# Patient Record
Sex: FEMALE | URBAN - METROPOLITAN AREA
[De-identification: names, ages, dates, MRNs, and addresses within clinical notes are randomized per-mention and may not be internally consistent; named-entity substitution may affect disease eponyms.]

---

## 2023-08-28 ENCOUNTER — TELEPHONE (OUTPATIENT)
Dept: OBGYN CLINIC | Facility: MEDICAL CENTER | Age: 23
End: 2023-08-28

## 2023-08-28 DIAGNOSIS — Z32.01 POSITIVE PREGNANCY TEST: Primary | ICD-10-CM

## 2023-08-28 NOTE — TELEPHONE ENCOUNTER
Patient called with positive at home test. LMP 07/01/2023 . Patient was informed labs will be place on her chart to do it once we have those results we will call her to set up a dating US. Advised patient to call back if any questions or concerns.  Aware we only deliver at [facility]

## 2023-09-05 ENCOUNTER — APPOINTMENT (OUTPATIENT)
Dept: LAB | Facility: MEDICAL CENTER | Age: 23
End: 2023-09-05

## 2023-09-05 DIAGNOSIS — Z32.01 POSITIVE PREGNANCY TEST: ICD-10-CM

## 2023-09-05 LAB
ABO GROUP BLD: NORMAL
B-HCG SERPL-ACNC: ABNORMAL MIU/ML (ref 0–5)
BLD GP AB SCN SERPL QL: NEGATIVE
PROGEST SERPL-MCNC: 21.13 NG/ML
RH BLD: POSITIVE
SPECIMEN EXPIRATION DATE: NORMAL

## 2023-09-05 PROCEDURE — 86901 BLOOD TYPING SEROLOGIC RH(D): CPT

## 2023-09-05 PROCEDURE — 86900 BLOOD TYPING SEROLOGIC ABO: CPT

## 2023-09-05 PROCEDURE — 86850 RBC ANTIBODY SCREEN: CPT

## 2023-09-05 PROCEDURE — 84702 CHORIONIC GONADOTROPIN TEST: CPT

## 2023-09-05 PROCEDURE — 36415 COLL VENOUS BLD VENIPUNCTURE: CPT

## 2023-09-05 PROCEDURE — 84144 ASSAY OF PROGESTERONE: CPT

## 2023-09-11 ENCOUNTER — TELEPHONE (OUTPATIENT)
Dept: OBGYN CLINIC | Facility: MEDICAL CENTER | Age: 23
End: 2023-09-11

## 2023-09-11 DIAGNOSIS — Z32.01 POSITIVE PREGNANCY TEST: Primary | ICD-10-CM

## 2023-09-11 NOTE — TELEPHONE ENCOUNTER
PT called in inquiring about her labs. PT is nervous because she has been high risk in the past. Please review, thanks.

## 2023-09-11 NOTE — TELEPHONE ENCOUNTER
Patient aware of blood work results. US order placed to get done through radiology due to schedule availability. Patient made aware once we get results and the report is reviewed by provider, we will call with next steps.

## 2023-09-12 ENCOUNTER — HOSPITAL ENCOUNTER (OUTPATIENT)
Dept: ULTRASOUND IMAGING | Facility: HOSPITAL | Age: 23
Discharge: HOME | End: 2023-09-12
Payer: COMMERCIAL

## 2023-09-12 ENCOUNTER — TELEPHONE (OUTPATIENT)
Dept: OBGYN CLINIC | Facility: CLINIC | Age: 23
End: 2023-09-12

## 2023-09-12 DIAGNOSIS — Z32.01 POSITIVE PREGNANCY TEST: ICD-10-CM

## 2023-09-12 PROCEDURE — 76801 OB US < 14 WKS SINGLE FETUS: CPT

## 2023-09-12 NOTE — TELEPHONE ENCOUNTER
Left message for patient to call office. Patient's ultrasound resulted with viable pregnancy.   Needs to schedule ob intake

## 2023-09-14 ENCOUNTER — TELEPHONE (OUTPATIENT)
Dept: OBGYN CLINIC | Facility: CLINIC | Age: 23
End: 2023-09-14

## 2023-09-14 NOTE — TELEPHONE ENCOUNTER
----- Message from [address] sent at 9/12/2023 12:47 PM EDT -----  Please call patient first trimester ultrasound is normal she is approximately 9 weeks and 4 days today. Recommend scheduling for an intake and OB H&P.   Thank you

## 2023-09-21 ENCOUNTER — INITIAL PRENATAL (OUTPATIENT)
Dept: OBGYN CLINIC | Facility: CLINIC | Age: 23
End: 2023-09-21
Payer: COMMERCIAL

## 2023-09-21 VITALS
HEIGHT: 64 IN | BODY MASS INDEX: 24.82 KG/M2 | DIASTOLIC BLOOD PRESSURE: 58 MMHG | SYSTOLIC BLOOD PRESSURE: 106 MMHG | WEIGHT: 145.4 LBS

## 2023-09-21 DIAGNOSIS — Z34.81 PRENATAL CARE, SUBSEQUENT PREGNANCY, FIRST TRIMESTER: Primary | ICD-10-CM

## 2023-09-21 PROCEDURE — T1001 NURSING ASSESSMENT/EVALUATN: HCPCS

## 2023-09-21 NOTE — PROGRESS NOTES
[id number]  OB History    Para Term  AB Living   3 2 2     2   SAB IAB Ectopic Multiple Live Births           2      # Outcome Date GA Lbr Efrain/2nd Weight Sex Delivery Anes PTL Lv   3 Current            2 Term 19 39w0d  3856 g (8 lb 8 oz) M Vag-Spont   VANESSA   1 Term 14 40w0d  3884 g (8 lb 9 oz) M Vag-Spont   VANESSA         * Pt presents for OB intake  *[id number]  *Pt's LMP was Patient's last menstrual period was 2023. *Ultrasound date:2023   9weeks 4days  *Estimated date of delivery: 2023   * confirmed by ultrasound    *Signs/Symptoms of Pregnancy   *no Constipation    *no Headaches   *no Cramping  *no Spotting   *yes nausea     Diabetes  If any of the following answers are  yes, please order 1 hour GTT, 50g   *no Hx of GDM    *no BMI >35    *no First degree relative with type 2 diabetes    *no Hx of PCOS   *no Current metformin use    *no Prior hx of LGA/macrosomia    *no AMA with other risk factors    Hypertension-    *no Hx of chronic HTN    *no Hx of gestational HTN   *no hx of preeclampsia, eclampsia, or HELLP syndrome     *Infection Screening   *no Does the pt have a hx of MRSA? *if yes- follow MRSA protocol and obtain a nasal swab for MRSA culture   *no History of herpes?      *Immunizations:   *yes Discussed influenza vaccine   *yes Discussed TDaP vaccine   *no COVID Vaccine     SOCIOECONOMIC:  Substance Use   THC *yes      - last used 2023    ACTIVE MEDICAL/MENTAL HEALTH CONDITIONS  Depression *no   Anxiety*no  Medications*no    *Interview education   *Handouts given:    *Baby and Me support center     *[facility] sign up instructions    *Lab Locations    * [place] List/Choosing Pediatrician Sheet    * [facility]    *Schedule for Prenatal Visits    *Pregnancy Warning Signs Reviewed    *Safe Medications During Pregnancy    *SMA and CF Testing information sheet    *CPT Code Sheet/MFM 13week NT pamphlet    *[employer]      SBIRT Screen:  Depression Screening Follow-up Plan: Patient's depression screening was negative with an Aurora score of 0        *[facility]   *yes discussed genetic testing- pt interested     Patient has been informed of basic prenatal advice such as avoiding alcohol, drugs, and smoking. She should remain hydrated and take daily prenatal vitamins. Patient should avoid caffeine, raw sprouts, high mercury fish, undercooked fish, raw eggs, organ meat, unwashed produce, and unpasteurized cheeses, milk, and fruit juice and undercooked meats. She has been informed about toxoplasmosis and to avoid cat feces. *Details that I feel the provider should be aware of:  Karo Albert presented today for initial ob intake at [address] [place] [address]. She has no complaints at this time. Reviewed medications she can take during pregnancy as well as warning signs and when to contact the office. Prenatal labs ordered, baseline thyroid labs ordered. MFM referral placed    PN1 visit scheduled for *10/16/2023. The patient was oriented to our practice and all questions were answered.     Interviewed by:  Chiquita Bell RN

## 2023-10-09 ENCOUNTER — ROUTINE PRENATAL (OUTPATIENT)
Dept: PERINATAL CARE | Facility: OTHER | Age: 23
End: 2023-10-09
Payer: COMMERCIAL

## 2023-10-09 VITALS
DIASTOLIC BLOOD PRESSURE: 66 MMHG | BODY MASS INDEX: 25.37 KG/M2 | WEIGHT: 148.6 LBS | SYSTOLIC BLOOD PRESSURE: 102 MMHG | HEIGHT: 64 IN | HEART RATE: 91 BPM

## 2023-10-09 DIAGNOSIS — Z3A.13 13 WEEKS GESTATION OF PREGNANCY: ICD-10-CM

## 2023-10-09 DIAGNOSIS — Z36.82 ENCOUNTER FOR NUCHAL TRANSLUCENCY TESTING: ICD-10-CM

## 2023-10-09 DIAGNOSIS — O36.80X0 ENCOUNTER TO DETERMINE FETAL VIABILITY OF PREGNANCY, SINGLE OR UNSPECIFIED FETUS: Primary | ICD-10-CM

## 2023-10-09 DIAGNOSIS — Z34.81 PRENATAL CARE, SUBSEQUENT PREGNANCY, FIRST TRIMESTER: ICD-10-CM

## 2023-10-09 PROCEDURE — 76801 OB US < 14 WKS SINGLE FETUS: CPT | Performed by: OBSTETRICS & GYNECOLOGY

## 2023-10-09 PROCEDURE — 76813 OB US NUCHAL MEAS 1 GEST: CPT | Performed by: OBSTETRICS & GYNECOLOGY

## 2023-10-09 PROCEDURE — 36415 COLL VENOUS BLD VENIPUNCTURE: CPT | Performed by: OBSTETRICS & GYNECOLOGY

## 2023-10-09 PROCEDURE — 99203 OFFICE O/P NEW LOW 30 MIN: CPT | Performed by: OBSTETRICS & GYNECOLOGY

## 2023-10-09 NOTE — PROGRESS NOTES
Patient chose to have [facility] Non-invasive Prenatal Screen with fetal sex. Patient given brochure and is aware [facility] will contact their insurance and coordinate coverage. Patient made aware she will need to respond to text message or e-mail from [address] within 2 business days or testing will be run through insurance. Patient informed text message will come from area code  "415". Provided [employer] # [phone] and web site : [email or web address]   "[employer] your test online" card with barcode and test tube ID provided to patient. Reviewed [employer]'s web site states 5-7 business days for results via their portal.   [employer] message will be sent to patient when [facility] receives results /provider reviews. 2 vials of blood drawn from  right arm by Pat DIAZ Patient tolerated blood draw without difficulty. Specimens labeled with patient identifiers (name, date of birth, specimen collection date), order and specimen were verified with patient, packed and sent via [address]. [employer]  tracking # [id number]  Copy of lab order scanned to Epic media. [facility] will have results in approximately 7-10 business days and will call patient or notify via [address] [place] [address]. Patient aware viewing lab result online will reveal fetal sex if ordered. Patient verbalized understanding of all instructions and no questions at this time.

## 2023-10-09 NOTE — LETTER
October 9, 2023     Elizabeth Hernandes, [address] [place] [address],[address]  [place]    Patient: Jessica Garrido   YOB: 2000   Date of Visit: 10/9/2023       Dear Dr. Gage Iraheta: Thank you for referring Jessica Garrido to me for evaluation. Below are my notes for this consultation. If you have questions, please do not hesitate to call me. I look forward to following your patient along with you. Sincerely,        Josr Cerda MD        CC: No Recipients    Josr Cerda MD  10/9/2023  2:23 PM  Sign when Signing Visit  Shelby Childress presents today for a genetic screening ultrasound. This is her third pregnancy. She has a history of 2 previous full-term vaginal deliveries without complications. She has no significant medical or surgical history otherwise. Substance use history and family medical history are unremarkable. A review of systems is otherwise negative. We discussed the options for genetic screening, including but not limited to first trimester screening, second trimester screening, combined first and second trimester screening, noninvasive prenatal screening (NIPS) for patients at high risk and diagnostic screening through the use of CVS and amniocentesis. We discussed the risks and benefits of each approach including the sensitivities and false positive rates as well as the difference between a screening test and a diagnostic test. At the conclusion of our discussion the patient elected noninvasive prenatal testing utilizing the [facility] Non-invasive prenatal screening (NIPS) test. The patient had this blood work drawn in the office and the results should be available approximately 7-10 days after her blood draw. Her results will be reported from [place]. We discussed follow-up in detail and I recommend an anatomy ultrasound be scheduled for 20 weeks gestation. Thank you very much for allowing us to participate in the care of this very nice patient.  Should you have any questions, please do not hesitate to contact me. Portions of the record may have been created with voice recognition software. Occasional wrong word or "sound a like" substitutions may have occurred due to the inherent limitations of voice recognition software. Read the chart carefully and recognize, using context, where substitutions have occurred.

## 2023-10-09 NOTE — PROGRESS NOTES
Neo Emanuel presents today for a genetic screening ultrasound. This is her third pregnancy. She has a history of 2 previous full-term vaginal deliveries without complications. She has no significant medical or surgical history otherwise. Substance use history and family medical history are unremarkable. A review of systems is otherwise negative. We discussed the options for genetic screening, including but not limited to first trimester screening, second trimester screening, combined first and second trimester screening, noninvasive prenatal screening (NIPS) for patients at high risk and diagnostic screening through the use of CVS and amniocentesis. We discussed the risks and benefits of each approach including the sensitivities and false positive rates as well as the difference between a screening test and a diagnostic test. At the conclusion of our discussion the patient elected noninvasive prenatal testing utilizing the [facility] Non-invasive prenatal screening (NIPS) test. The patient had this blood work drawn in the office and the results should be available approximately 7-10 days after her blood draw. Her results will be reported from [place]. We discussed follow-up in detail and I recommend an anatomy ultrasound be scheduled for 20 weeks gestation. Thank you very much for allowing us to participate in the care of this very nice patient. Should you have any questions, please do not hesitate to contact me. Portions of the record may have been created with voice recognition software. Occasional wrong word or "sound a like" substitutions may have occurred due to the inherent limitations of voice recognition software. Read the chart carefully and recognize, using context, where substitutions have occurred.

## 2023-10-16 ENCOUNTER — TELEPHONE (OUTPATIENT)
Dept: OBGYN CLINIC | Facility: MEDICAL CENTER | Age: 23
End: 2023-10-16

## 2023-10-16 DIAGNOSIS — Z34.91 FIRST TRIMESTER PREGNANCY: Primary | ICD-10-CM

## 2023-10-16 PROBLEM — Z3A.14 14 WEEKS GESTATION OF PREGNANCY: Status: ACTIVE | Noted: 2023-10-09

## 2023-10-16 NOTE — TELEPHONE ENCOUNTER
Left message for patient to call office    Patient no showed for initial ob visit - this will need to be rescheduled prior to next scheduled appointment.       She also needs to get her prenatal labs completed as well as AFP - everything is in the computer for her to complete

## 2023-10-18 ENCOUNTER — TELEPHONE (OUTPATIENT)
Dept: OBGYN CLINIC | Facility: CLINIC | Age: 23
End: 2023-10-18

## 2023-10-18 NOTE — TELEPHONE ENCOUNTER
Second attempt    Left message for patient to call office      Patient no showed for initial ob visit - this will need to be rescheduled prior to next scheduled appointment.        She also needs to get her prenatal labs completed as well as AFP - everything is in the computer for her to complete    [employer] message sent

## 2024-03-11 LAB
BACTERIA UR QL AUTO: ABNORMAL /HPF
BILIRUB UR QL STRIP: NEGATIVE
CAOX CRY URNS QL MICRO: ABNORMAL /HPF
CLARITY UR: CLEAR
COLOR UR: ABNORMAL
GLUCOSE UR STRIP-MCNC: NEGATIVE MG/DL
HGB UR QL STRIP.AUTO: NEGATIVE
KETONES UR STRIP-MCNC: NEGATIVE MG/DL
LEUKOCYTE ESTERASE UR QL STRIP: ABNORMAL
MUCOUS THREADS UR QL AUTO: ABNORMAL
NITRITE UR QL STRIP: NEGATIVE
NON-SQ EPI CELLS URNS QL MICRO: ABNORMAL /HPF
PH UR STRIP.AUTO: 6.5 [PH]
PROT UR STRIP-MCNC: NEGATIVE MG/DL
RBC #/AREA URNS AUTO: ABNORMAL /HPF
SP GR UR STRIP.AUTO: 1.01 (ref 1–1.03)
UROBILINOGEN UR STRIP-ACNC: <2 MG/DL
WBC #/AREA URNS AUTO: ABNORMAL /HPF